# Patient Record
Sex: MALE | Race: WHITE | URBAN - METROPOLITAN AREA
[De-identification: names, ages, dates, MRNs, and addresses within clinical notes are randomized per-mention and may not be internally consistent; named-entity substitution may affect disease eponyms.]

---

## 2018-11-23 ENCOUNTER — OFFICE VISIT (OUTPATIENT)
Dept: FAMILY MEDICINE | Facility: CLINIC | Age: 23
End: 2018-11-23

## 2018-11-23 VITALS
OXYGEN SATURATION: 96 % | DIASTOLIC BLOOD PRESSURE: 97 MMHG | SYSTOLIC BLOOD PRESSURE: 162 MMHG | TEMPERATURE: 98.2 F | WEIGHT: 166 LBS | HEART RATE: 103 BPM | BODY MASS INDEX: 23.5 KG/M2

## 2018-11-23 DIAGNOSIS — Z23 NEED FOR PROPHYLACTIC VACCINATION AND INOCULATION AGAINST INFLUENZA: ICD-10-CM

## 2018-11-23 DIAGNOSIS — L08.9 PUSTULE: ICD-10-CM

## 2018-11-23 DIAGNOSIS — K14.0 TONGUE ULCER: Primary | ICD-10-CM

## 2018-11-23 PROCEDURE — 99214 OFFICE O/P EST MOD 30 MIN: CPT | Mod: 25 | Performed by: FAMILY MEDICINE

## 2018-11-23 PROCEDURE — 90471 IMMUNIZATION ADMIN: CPT | Performed by: FAMILY MEDICINE

## 2018-11-23 PROCEDURE — 90686 IIV4 VACC NO PRSV 0.5 ML IM: CPT | Performed by: FAMILY MEDICINE

## 2018-11-23 RX ORDER — LAMOTRIGINE 25 MG/1
TABLET ORAL
Refills: 2 | COMMUNITY
Start: 2018-11-19

## 2018-11-23 ASSESSMENT — ANXIETY QUESTIONNAIRES
GAD7 TOTAL SCORE: 7
IF YOU CHECKED OFF ANY PROBLEMS ON THIS QUESTIONNAIRE, HOW DIFFICULT HAVE THESE PROBLEMS MADE IT FOR YOU TO DO YOUR WORK, TAKE CARE OF THINGS AT HOME, OR GET ALONG WITH OTHER PEOPLE: VERY DIFFICULT
2. NOT BEING ABLE TO STOP OR CONTROL WORRYING: SEVERAL DAYS
3. WORRYING TOO MUCH ABOUT DIFFERENT THINGS: SEVERAL DAYS
6. BECOMING EASILY ANNOYED OR IRRITABLE: SEVERAL DAYS
1. FEELING NERVOUS, ANXIOUS, OR ON EDGE: SEVERAL DAYS
5. BEING SO RESTLESS THAT IT IS HARD TO SIT STILL: SEVERAL DAYS
7. FEELING AFRAID AS IF SOMETHING AWFUL MIGHT HAPPEN: SEVERAL DAYS

## 2018-11-23 ASSESSMENT — PATIENT HEALTH QUESTIONNAIRE - PHQ9
5. POOR APPETITE OR OVEREATING: SEVERAL DAYS
SUM OF ALL RESPONSES TO PHQ QUESTIONS 1-9: 9

## 2018-11-23 NOTE — PATIENT INSTRUCTIONS
1. Set up the ENT appointment for the tongue ulcer.    2. Let me see you back in about 3 months for a physical with fasting labs.    3. Send My Chart message if you have any questions.

## 2018-11-23 NOTE — LETTER
My Depression Action Plan  Name: Boris Tinoco   Date of Birth 1995  Date: 11/23/2018    My doctor: Reena Heard   My clinic: [facility]  [id number] [place] [id number] [phone]          GREEN    ZONE   Good Control    What it looks like:     Things are going generally well. You have normal up s and down s. You may even feel depressed from time to time, but bad moods usually last less than a day.   What you need to do:  1. Continue to care for yourself (see self care plan)  2. Check your depression survival kit and update it as needed  3. Follow your physician s recommendations including any medication.  4. Do not stop taking medication unless you consult with your physician first.           YELLOW         ZONE Getting Worse    What it looks like:     Depression is starting to interfere with your life.     It may be hard to get out of bed; you may be starting to isolate yourself from others.    Symptoms of depression are starting to last most all day and this has happened for several days.     You may have suicidal thoughts but they are not constant.   What you need to do:     1. Call your care team, your response to treatment will improve if you keep your care team informed of your progress. Yellow periods are signs an adjustment may need to be made.     2. Continue your self-care, even if you have to fake it!    3. Talk to someone in your support network    4. Open up your depression survival kit           RED    ZONE Medical Alert - Get Help    What it looks like:     Depression is seriously interfering with your life.     You may experience these or other symptoms: You can t get out of bed most days, can t work or engage in other necessary activities, you have trouble taking care of basic hygiene, or basic responsibilities, thoughts of suicide or death that will not go away, self-injurious behavior.     What you need to do:  1. Call your care  team and request a same-day appointment. If they are not available (weekends or after hours) call your local crisis line, emergency room or 911.            Depression Self Care Plan / Survival Kit    Self-Care for Depression  Here s the deal. Your body and mind are really not as separate as most people think.  What you do and think affects how you feel and how you feel influences what you do and think. This means if you do things that people who feel good do, it will help you feel better.  Sometimes this is all it takes.  There is also a place for medication and therapy depending on how severe your depression is, so be sure to consult with your medical provider and/ or Behavioral Health Consultant if your symptoms are worsening or not improving.     In order to better manage my stress, I will:    Exercise  Get some form of exercise, every day. This will help reduce pain and release endorphins, the  feel good  chemicals in your brain. This is almost as good as taking antidepressants!  This is not the same as joining a gym and then never going! (they count on that by the way ) It can be as simple as just going for a walk or doing some gardening, anything that will get you moving.      Hygiene   Maintain good hygiene (Get out of bed in the morning, Make your bed, Brush your teeth, Take a shower, and Get dressed like you were going to work, even if you are unemployed).  If your clothes don't fit try to get ones that do.    Diet  I will strive to eat foods that are good for me, drink plenty of water, and avoid excessive sugar, caffeine, alcohol, and other mood-altering substances.  Some foods that are helpful in depression are: complex carbohydrates, B vitamins, flaxseed, fish or fish oil, fresh fruits and vegetables.    Psychotherapy  I agree to participate in Individual Therapy (if recommended).    Medication  If prescribed medications, I agree to take them.  Missing doses can result in serious side effects.  I  understand that drinking alcohol, or other illicit drug use, may cause potential side effects.  I will not stop my medication abruptly without first discussing it with my provider.    Staying Connected With Others  I will stay in touch with my friends, family members, and my primary care provider/team.    Use your imagination  Be creative.  We all have a creative side; it doesn t matter if it s oil painting, sand castles, or mud pies! This will also kick up the endorphins.    Witness Beauty  (AKA stop and smell the roses) Take a look outside, even in mid-winter. Notice colors, textures. Watch the squirrels and birds.     Service to others  Be of service to others.  There is always someone else in need.  By helping others we can  get out of ourselves  and remember the really important things.  This also provides opportunities for practicing all the other parts of the program.    Humor  Laugh and be silly!  Adjust your TV habits for less news and crime-drama and more comedy.    Control your stress  Try breathing deep, massage therapy, biofeedback, and meditation. Find time to relax each day.     My support system    Clinic Contact:  Phone number:    Contact 1:  Phone number:    Contact 2:  Phone number:    [facility]/:  Phone number:    Therapist:  Phone number:    Local crisis center:    Phone number:    Other community support:  Phone number:

## 2018-11-23 NOTE — MR AVS SNAPSHOT
After Visit Summary   11/23/2018    Boris Tinoco    MRN: [id number]           Patient Information     Date Of Birth          1995        Visit Information        Provider Department      11/23/2018 1:30 PM Carrillo Casanvoa MD [facility]        Today's Diagnoses     Tongue ulcer    -  1      Care Instructions    1. Set up the ENT appointment for the tongue ulcer.    2. Let me see you back in about 3 months for a physical with fasting labs.    3. Send My Chart message if you have any questions.          Follow-ups after your visit        Additional Services     OTOLARYNGOLOGY REFERRAL       Your provider has referred you to: [facility]: [facility] [phone]  [email or web address]    Please be aware that coverage of these services is subject to the terms and limitations of your health insurance plan.  Call member services at your health plan with any benefit or coverage questions.      Please bring the following with you to your appointment:    (1) Any X-Rays, CTs or MRIs which have been performed.  Contact the facility where they were done to arrange for  prior to your scheduled appointment.   (2) List of current medications  (3) This referral request   (4) Any documents/labs given to you for this referral                  Follow-up notes from your care team     Return in about 3 months (around 2/23/2019) for Physical Exam, Lab Work.      Who to contact     If you have questions or need follow up information about today's clinic visit or your schedule please contact [facility] directly at [phone].  Normal or non-critical lab and imaging results will be communicated to you by MyChart, letter or phone within 4 business days after the clinic has received the results. If you do not hear from us within 7 days, please contact the clinic through MyChart or phone. If you have a critical or abnormal lab result, we will  "notify you by phone as soon as possible.  Submit refill requests through [employer] or call your pharmacy and they will forward the refill request to us. Please allow 3 business days for your refill to be completed.          Additional Information About Your Visit        [employer] Information     [employer] lets you send messages to your doctor, view your test results, renew your prescriptions, schedule appointments and more. To sign up, go to [email or web address] . Click on \"Log in\" on the left side of the screen, which will take you to the Welcome page. Then click on \"Sign up Now\" on the right side of the page.     You will be asked to enter the access code listed below, as well as some personal information. Please follow the directions to create your username and password.     Your access code is: [email or web address]  Expires: 2019  1:26 PM     Your access code will  in 90 days. If you need help or a new code, please call your [facility] clinic or [phone].        Care EveryWhere ID     This is your Care EveryWhere ID. This could be used by other organizations to access your [facility] medical records  [id number]        Your Vitals Were     Pulse Temperature Pulse Oximetry BMI (Body Mass Index)          103 98.2  F (36.8  C) (Oral) 96% 23.5 kg/m2         Blood Pressure from Last 3 Encounters:   18 (!) 162/97   06/11/15 137/69   14 120/70    Weight from Last 3 Encounters:   18 166 lb (75.3 kg)   06/11/15 147 lb 9.6 oz (67 kg)   14 151 lb (68.5 kg) (45 %)*     * Growth percentiles are based on CDC 2-20 Years data.              We Performed the Following     OTOLARYNGOLOGY REFERRAL        Primary Care Provider Office Phone # Fax #    Reena Heard PA-C [phone] [address] [place] [id number]        Equal Access to Services     CURT STREETER : Zev Comer, toni mosqueda, qaybta kaalmada john john" lashaun giordano. So [facility] [phone].    ATENCIÓN: Si habla ho, tiene a guallpa disposición servicios gratuitos de asistencia lingüística. Llrsus al [phone].    We comply with applicable federal civil rights laws and [place] laws. We do not discriminate on the basis of race, color, national origin, age, disability, sex, sexual orientation, or gender identity.            Thank you!     Thank you for choosing [facility]  for your care. Our goal is always to provide you with excellent care. Hearing back from our patients is one way we can continue to improve our services. Please take a few minutes to complete the written survey that you may receive in the mail after your visit with us. Thank you!             Your Updated Medication List - Protect others around you: Learn how to safely use, store and throw away your medicines at www.disposemymeds.org.          This list is accurate as of 11/23/18  2:08 PM.  Always use your most recent med list.                   Brand Name Dispense Instructions for use Diagnosis    lamoTRIgine 25 MG tablet    LaMICtal

## 2018-11-23 NOTE — NURSING NOTE
"Chief Complaint   Patient presents with     Other     canker sore       Initial BP (!) 162/97 (Cuff Size: Adult Regular)  Pulse 103  Temp 98.2  F (36.8  C) (Oral)  Wt 166 lb (75.3 kg)  SpO2 96%  BMI 23.5 kg/m2 Estimated body mass index is 23.5 kg/(m^2) as calculated from the following:    Height as of 6/11/15: 5' 10.47\" (1.79 m).    Weight as of this encounter: 166 lb (75.3 kg).      Zonia Holliday LPN    "

## 2018-11-23 NOTE — PROGRESS NOTES

## 2018-11-23 NOTE — PROGRESS NOTES
"HPI:    I am seeing Boris Tinoco for the 1st time. He is a 23 year old male here to discuss:    He previously followed with [facility]    Tongue ulcer - he tells me he has history of canker sores. This episode started in Najmay 2018. It is one lesion, located on the lateral, proximal aspect of the tongue. It is not painful but there is an irritation. It gets sensitive with spicy foods. He denies other lesions. No fevers, weight loss. He is former smoker. Currently using e cigs.  Evaluation and treatment:    I told him it would be unusual for a canker sore to last this long without abating.   I am referring him to ENT for further evaluation.    Pustule - in 2016 he was seen at [facility] for genital warts. Those have resolved but he says he has noticed intermittent \"white head\" on the dorsum of the penile shaft. It is painless and there is no ulcer or blister. He says it is current not present. Last sex was 2014.  Evaluation and treatment:    The exam is normal today.   I reassured him that this is likely not an STD.   I would be helpful if he comes in when he gets this lesion or send in a picture (closeup to show the lesion only).    History of seizures - 1st seizure in 2013.  Evaluation and treatment:    He is on Lamictal.   Follows with neurology?    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  Evaluation and treatment:    He was seen here at [place] in 2015 - he was rx'd Zoloft - not taking at this time.   He was seen at [facility] in 2019 - he was rx'd Celexa - not taking at this time.   We did not address this further today.    Elevated BP and heart rate -  Evaluation and treatment:    Likely related to anxiety.   We will recheck at future visits.    As an aside, we gave him a flu shot.    ROS:    Const: No fevers, weight changes or night sweats recently.  ENT: No runny nose, sore throat or ear pain.  Resp: No cough or shortness of breath.  CV: No chest pain, dizziness " or cardiac palpitations.  GI: No nausea, vomiting, diarrhea or constipation. Denies blood in stools or black stools.  : No dysuria, frequency or hematuria.  The rest of the ROS negative, other than listed on HPI    Social History     Social History     Marital status: Single     Spouse name: N/A     Number of children: N/A     Years of education: N/A     Occupational History     Not on file.     Social History Main Topics     Smoking status: Never Smoker     Smokeless tobacco: Never Used      Comment: e-CIG     Alcohol use Yes      Comment: DAILY     Drug use: No     Sexual activity: Yes     Other Topics Concern     Not on file     Social History Narrative         Exam:    BP (!) 162/97 (Cuff Size: Adult Regular)  Pulse 103  Temp 98.2  F (36.8  C) (Oral)  Wt 166 lb (75.3 kg)  SpO2 96%  BMI 23.5 kg/m2    Gen: Healthy appearing male in no acute distress. He is here with mom, except for the genital exam we went to a different room.  ENT: Oropharynx normal. Oral mucosa dry due to being nervous today. The lesion is difficult to find without close exam. It is located on the lateral, proximal aspect of the tongue. It is a shallow ulcer with whitish crater. It is oval about 4 x 5 mm. It has no surrounding erythema or swelling. No discharge.  Eyes: Conjunctiva and sclera normal. Pupils react normally to light. No nystagmus.  Neck: No enlarged lymph nodes, thyromegally or other masses.  Lungs: Good air movement and otherwise clear.  CV: Heart RRR with no murmurs.  Psych: affect is visibly anxious, nervous.   Skin: the penis appears normal without any lesions.    Assessment and Plan - Decision Making    1. Tongue ulcer    Per HPI    - OTOLARYNGOLOGY REFERRAL    2. Pustule    Per HPI      3. Need for prophylactic vaccination and inoculation against influenza    - FLU VACCINE, SPLIT VIRUS, IM (QUADRIVALENT) [18551]- >3 YRS  - Vaccine Administration, Initial [68848]      Written instructions given as follows:    Patient  Instructions   1. Set up the ENT appointment for the tongue ulcer.    2. Let me see you back in about 3 months for a physical with fasting labs.    3. Send My Chart message if you have any questions.

## 2018-11-24 ASSESSMENT — ANXIETY QUESTIONNAIRES: GAD7 TOTAL SCORE: 7

## 2018-11-29 ENCOUNTER — OFFICE VISIT (OUTPATIENT)
Dept: OTOLARYNGOLOGY | Facility: CLINIC | Age: 23
End: 2018-11-29

## 2018-11-29 VITALS
HEART RATE: 97 BPM | HEIGHT: 71 IN | DIASTOLIC BLOOD PRESSURE: 95 MMHG | SYSTOLIC BLOOD PRESSURE: 164 MMHG | BODY MASS INDEX: 23.66 KG/M2 | RESPIRATION RATE: 18 BRPM | WEIGHT: 169 LBS | OXYGEN SATURATION: 97 % | TEMPERATURE: 97.6 F

## 2018-11-29 DIAGNOSIS — K14.0 TONGUE ULCERATION: Primary | ICD-10-CM

## 2018-11-29 PROCEDURE — 99203 OFFICE O/P NEW LOW 30 MIN: CPT | Performed by: OTOLARYNGOLOGY

## 2018-11-29 ASSESSMENT — PAIN SCALES - GENERAL: PAINLEVEL: NO PAIN (0)

## 2018-11-29 NOTE — LETTER
11/29/2018         RE: Boris Tinoco  [address] [place] [address]        Dear Colleague,    Thank you for referring your patient, Boris Tinoco, to the [facility]. Please see a copy of my visit note below.        History of Present Illness - Boris Tinoco is a 23 year old male with a tongue lesion for 9 months since 03/2018. Patient states it is sensitive to spicy food, and will occasionally turn white. He denies that it ever goes away completely. He denies any inciting events. Patient appears anxious and has many questions. He complains of a sore throat for the past week. He also has an intermittent lump in his throat. He has noticed symmetric bumps on the inside of his cheeks.    Past Medical History -   Patient Active Problem List   Diagnosis     Seizure disorder (H)     Generalized anxiety disorder     Depression with anxiety       Current Medications -   Current Outpatient Prescriptions:      lamoTRIgine (LAMICTAL) 25 MG tablet, , Disp: , Rfl: 2    Allergies - No Known Allergies    Social History -   Social History     Social History     Marital status: Single     Spouse name: N/A     Number of children: N/A     Years of education: N/A     Social History Main Topics     Smoking status: Never Smoker     Smokeless tobacco: Never Used      Comment: e-CIG     Alcohol use Yes      Comment: DAILY     Drug use: No     Sexual activity: Yes     Other Topics Concern     Not on file     Social History Narrative       Family History -   Family History   Problem Relation Age of Onset     Depression Mother      Diabetes No family hx of      Coronary Artery Disease No family hx of      Hypertension No family hx of      Hyperlipidemia No family hx of      Cerebrovascular Disease No family hx of        Review of Systems - As per HPI and PMHx, otherwise 7 system review of the head and neck negative. 10+ system review negative.    Physical Exam  BP (!) 164/95 (BP Location: Right  "arm, Patient Position: Sitting, Cuff Size: Adult Regular)  Pulse 97  Temp 97.6  F (36.4  C)  Resp 18  Ht 1.796 m (5' 10.7\")  Wt 76.7 kg (169 lb)  SpO2 97%  BMI 23.77 kg/m2  General - The patient is well nourished and well developed, and appears to have good nutritional status.  Alert and oriented to person and place, answers questions and cooperates with examination appropriately.   Head and Face - Normocephalic and atraumatic, with no gross asymmetry noted of the contour of the facial features.  The facial nerve is intact, with strong symmetric movements.  Voice and Breathing - The patient was breathing comfortably without the use of accessory muscles. There was no wheezing, stridor, or stertor.  The patients voice was clear and strong, and had appropriate pitch and quality.  Ears - Bilateral pinna and EACs with normal appearing overlying skin. Tympanic membrane intact with good mobility on pneumatic otoscopy bilaterally. Bony landmarks of the ossicular chain are normal. The tympanic membranes are normal in appearance. No retraction, perforation, or masses.  No fluid or purulence was seen in the external canal or the middle ear.   Eyes - Extraocular movements intact.  Sclera were not icteric or injected, conjunctiva were pink and moist.  Mouth - Examination of the oral cavity showed pink, healthy oral mucosa. The tongue was mobile and midline, and the dentition were in good condition. 4x3mm fibrinous ulcer on the right ventrolateral tongue, bleeds when scraped with a cotton swab.  I applied topical lidocaine to ulceration. Then proceeded to apply silver nitrate to cauterize the area.   Throat - The walls of the oropharynx were smooth, pink, moist, symmetric, and had no lesions or ulcerations.  The tonsils were inflamed bilaterally and soft palate was symmetric.  The uvula was midline on elevation.   Neck - Normal midline excursion of the laryngotracheal complex during swallowing.  Full range of motion on " passive movement.  Palpation of the occipital, submental, submandibular, internal jugular chain, and supraclavicular nodes did not demonstrate any abnormal lymph nodes or masses.  The carotid pulse was palpable bilaterally.  Palpation of the thyroid was soft and smooth, with no nodules or goiter appreciated.  The trachea was mobile and midline.  Nose - External contour is symmetric, no gross deflection or scars.  Nasal mucosa is pink and moist with no abnormal mucus.  The septum was midline and non-obstructive, turbinates of normal size and position.  No polyps, masses, or purulence noted on examination.      Assessment - Boris Tinoco is a 23 year old male with an oral ulceration on the right tongue. I reassured him that this does not appear malignant. I tried to ablate the lesion with silver nitrate. I asked him to return in the next few weeks for f/u. If the lesion recurs, may consider excision for pathology.    Patient to follow up with Primary Care provider regarding elevated blood pressure.    Dr. Suzy Figueroa MD  Otolaryngology  [facility]    This document serves as a record of the services and decisions personally performed and made by Dr. Suzy Figueroa MD. It was created on his behalf by Karolina Prescott, a trained medical scribe. The creation of this document is based the provider's statements to the medical scribe.    Sivakumar Prescott 1:14 PM 11/29/2018     The information in this document, created by a scribe for me, accurately reflects the services I personally performed and the decisions made by me. I have reviewed and approved this document for accuracy.    Again, thank you for allowing me to participate in the care of your patient.        Sincerely,        Suzy Figueroa MD

## 2018-11-29 NOTE — MR AVS SNAPSHOT
After Visit Summary   11/29/2018    Boris Tinoco    MRN: [id number]           Patient Information     Date Of Birth          1995        Visit Information        Provider Department      11/29/2018 1:00 PM Suzy Figueroa MD [facility]        Today's Diagnoses     Tongue ulceration    -  1      Care Instructions    Scheduling Information  To schedule your CT/MRI scan, please contact [facility] at [phone] OR [facility] at [phone]    To schedule your Surgery, please contact our Specialty Schedulers at [phone]    ** If a CT scan or biopsy were ordered/done, Dr. Figueroa will need to see you back in clinic to go over your biopsy results or CT/MRI scan. He will go over the images from your scan with you and discuss treatment based on your results.     ENT Clinic Locations Clinic Hours Telephone Number     [facility]  [address]  [place] [address]   E/O Thursday:      7:30am -- 4:00pm   To schedule/reschedule an appointment with   Dr. Figueroa,   please contact our   Specialty Scheduling Department at:     [phone]       [facility]  [address] [place] [address]     Monday:              12:00pm -- 4:00pm    Tuesday:               8:30am -- 4:30pm    Wednesday:        12:00pm -- 4:00pm    E/O Thursday:        8:00am - 4:30pm           Urgent Care Locations Clinic Hours Telephone Numbers     [facility]  [address]  [place] [address]     Monday-Friday:     11:00am - 9:00pm    Saturday-Sunday:  9:00am - 5:00pm   [phone]     [facility]  [address] [facility] [place] [address]     Monday-Friday:      5:00pm - 9:00pm     Saturday-Sunday:  9:00am - 5:00pm   [phone]   \          Follow-ups after your visit        Who to contact     If you have questions or need follow up information about today's clinic visit or your schedule please contact [facility] directly at  "[phone].  Normal or non-critical lab and imaging results will be communicated to you by MyChart, letter or phone within 4 business days after the clinic has received the results. If you do not hear from us within 7 days, please contact the clinic through [employer] or phone. If you have a critical or abnormal lab result, we will notify you by phone as soon as possible.  Submit refill requests through [employer] or call your pharmacy and they will forward the refill request to us. Please allow 3 business days for your refill to be completed.          Additional Information About Your Visit        [employer] Information     [employer] gives you secure access to your electronic health record. If you see a primary care provider, you can also send messages to your care team and make appointments. If you have questions, please call your primary care clinic.  If you do not have a primary care provider, please call [phone] and they will assist you.        Care EveryWhere ID     This is your Care EveryWhere ID. This could be used by other organizations to access your [facility] medical records  [id number]        Your Vitals Were     Pulse Temperature Respirations Height Pulse Oximetry BMI (Body Mass Index)    97 97.6  F (36.4  C) 18 1.796 m (5' 10.7\") 97% 23.77 kg/m2       Blood Pressure from Last 3 Encounters:   11/29/18 (!) 164/95   11/23/18 (!) 162/97   06/11/15 137/69    Weight from Last 3 Encounters:   11/29/18 76.7 kg (169 lb)   11/23/18 75.3 kg (166 lb)   06/11/15 67 kg (147 lb 9.6 oz)              Today, you had the following     No orders found for display       Primary Care Provider Office Phone # Fax #    Reena Heard PA-C [phone]       [address] [place] [address]        Equal Access to Services     CUR[employer] STREETER : Zev conner Somarleen, waaxda luqadaha, qaybta kaalmasabine john, john giordano. So [facility] [phone].    ATENCIÓN: Si habla " español, tiene a guallpa disposición servicios gratuitos de asistencia lingüística. Harish beltran [phone].    We comply with applicable federal civil rights laws and [place] laws. We do not discriminate on the basis of race, color, national origin, age, disability, sex, sexual orientation, or gender identity.            Thank you!     Thank you for choosing [facility]  for your care. Our goal is always to provide you with excellent care. Hearing back from our patients is one way we can continue to improve our services. Please take a few minutes to complete the written survey that you may receive in the mail after your visit with us. Thank you!             Your Updated Medication List - Protect others around you: Learn how to safely use, store and throw away your medicines at www.disposemymeds.org.          This list is accurate as of 11/29/18  1:46 PM.  Always use your most recent med list.                   Brand Name Dispense Instructions for use Diagnosis    lamoTRIgine 25 MG tablet    LaMICtal

## 2018-11-29 NOTE — PATIENT INSTRUCTIONS
Scheduling Information  To schedule your CT/MRI scan, please contact [facility] at [phone] OR [facility] at [phone]    To schedule your Surgery, please contact our Specialty Schedulers at [phone]    ** If a CT scan or biopsy were ordered/done, Dr. Figueroa will need to see you back in clinic to go over your biopsy results or CT/MRI scan. He will go over the images from your scan with you and discuss treatment based on your results.     ENT Clinic Locations Clinic Hours Telephone Number     [facility]  [address]  [place] [address]   E/O Thursday:      7:30am -- 4:00pm   To schedule/reschedule an appointment with   Dr. Figueroa,   please contact our   Specialty Scheduling Department at:     [phone]       [facility]  [address] [place] [address]     Monday:              12:00pm -- 4:00pm    Tuesday:               8:30am -- 4:30pm    Wednesday:        12:00pm -- 4:00pm    E/O Thursday:        8:00am - 4:30pm           Urgent Care Locations Clinic Hours Telephone Numbers     [facility]  [address] [place] [address]     Monday-Friday:     11:00am - 9:00pm    Saturday-Sunday:  9:00am - 5:00pm   [phone]     [facility]  [address] [place] [address]     Monday-Friday:      5:00pm - 9:00pm     Saturday-Sunday:  9:00am - 5:00pm   [phone]   \

## 2018-11-29 NOTE — PROGRESS NOTES
"    History of Present Illness - Boris Tinoco is a 23 year old male with a tongue lesion for 9 months since 03/2018. Patient states it is sensitive to spicy food, and will occasionally turn white. He denies that it ever goes away completely. He denies any inciting events. Patient appears anxious and has many questions. He complains of a sore throat for the past week. He also has an intermittent lump in his throat. He has noticed symmetric bumps on the inside of his cheeks.    Past Medical History -   Patient Active Problem List   Diagnosis     Seizure disorder (H)     Generalized anxiety disorder     Depression with anxiety       Current Medications -   Current Outpatient Prescriptions:      lamoTRIgine (LAMICTAL) 25 MG tablet, , Disp: , Rfl: 2    Allergies - No Known Allergies    Social History -   Social History     Social History     Marital status: Single     Spouse name: N/A     Number of children: N/A     Years of education: N/A     Social History Main Topics     Smoking status: Never Smoker     Smokeless tobacco: Never Used      Comment: e-CIG     Alcohol use Yes      Comment: DAILY     Drug use: No     Sexual activity: Yes     Other Topics Concern     Not on file     Social History Narrative       Family History -   Family History   Problem Relation Age of Onset     Depression Mother      Diabetes No family hx of      Coronary Artery Disease No family hx of      Hypertension No family hx of      Hyperlipidemia No family hx of      Cerebrovascular Disease No family hx of        Review of Systems - As per HPI and PMHx, otherwise 7 system review of the head and neck negative. 10+ system review negative.    Physical Exam  BP (!) 164/95 (BP Location: Right arm, Patient Position: Sitting, Cuff Size: Adult Regular)  Pulse 97  Temp 97.6  F (36.4  C)  Resp 18  Ht 1.796 m (5' 10.7\")  Wt 76.7 kg (169 lb)  SpO2 97%  BMI 23.77 kg/m2  General - The patient is well nourished and well developed, and appears to " have good nutritional status.  Alert and oriented to person and place, answers questions and cooperates with examination appropriately.   Head and Face - Normocephalic and atraumatic, with no gross asymmetry noted of the contour of the facial features.  The facial nerve is intact, with strong symmetric movements.  Voice and Breathing - The patient was breathing comfortably without the use of accessory muscles. There was no wheezing, stridor, or stertor.  The patients voice was clear and strong, and had appropriate pitch and quality.  Ears - Bilateral pinna and EACs with normal appearing overlying skin. Tympanic membrane intact with good mobility on pneumatic otoscopy bilaterally. Bony landmarks of the ossicular chain are normal. The tympanic membranes are normal in appearance. No retraction, perforation, or masses.  No fluid or purulence was seen in the external canal or the middle ear.   Eyes - Extraocular movements intact.  Sclera were not icteric or injected, conjunctiva were pink and moist.  Mouth - Examination of the oral cavity showed pink, healthy oral mucosa. The tongue was mobile and midline, and the dentition were in good condition. 4x3mm fibrinous ulcer on the right ventrolateral tongue, bleeds when scraped with a cotton swab.  I applied topical lidocaine to ulceration. Then proceeded to apply silver nitrate to cauterize the area.   Throat - The walls of the oropharynx were smooth, pink, moist, symmetric, and had no lesions or ulcerations.  The tonsils were inflamed bilaterally and soft palate was symmetric.  The uvula was midline on elevation.   Neck - Normal midline excursion of the laryngotracheal complex during swallowing.  Full range of motion on passive movement.  Palpation of the occipital, submental, submandibular, internal jugular chain, and supraclavicular nodes did not demonstrate any abnormal lymph nodes or masses.  The carotid pulse was palpable bilaterally.  Palpation of the thyroid was soft  and smooth, with no nodules or goiter appreciated.  The trachea was mobile and midline.  Nose - External contour is symmetric, no gross deflection or scars.  Nasal mucosa is pink and moist with no abnormal mucus.  The septum was midline and non-obstructive, turbinates of normal size and position.  No polyps, masses, or purulence noted on examination.      Assessment - Boris Tinoco is a 23 year old male with an oral ulceration on the right tongue. I reassured him that this does not appear malignant. I tried to ablate the lesion with silver nitrate. I asked him to return in the next few weeks for f/u. If the lesion recurs, may consider excision for pathology.    Patient to follow up with Primary Care provider regarding elevated blood pressure.    Dr. Suzy Figueroa MD  Otolaryngology  [facility]    This document serves as a record of the services and decisions personally performed and made by Dr. Suzy Figueroa MD. It was created on his behalf by Karolina Prescott, a trained medical scribe. The creation of this document is based the provider's statements to the medical scribe.    Sivakumar Prescott 1:14 PM 11/29/2018     The information in this document, created by a scribe for me, accurately reflects the services I personally performed and the decisions made by me. I have reviewed and approved this document for accuracy.

## 2020-03-01 ENCOUNTER — HEALTH MAINTENANCE LETTER (OUTPATIENT)
Age: 25
End: 2020-03-01

## 2020-12-14 ENCOUNTER — HEALTH MAINTENANCE LETTER (OUTPATIENT)
Age: 25
End: 2020-12-14

## 2021-04-18 ENCOUNTER — HEALTH MAINTENANCE LETTER (OUTPATIENT)
Age: 26
End: 2021-04-18

## 2021-10-02 ENCOUNTER — HEALTH MAINTENANCE LETTER (OUTPATIENT)
Age: 26
End: 2021-10-02

## 2022-05-14 ENCOUNTER — HEALTH MAINTENANCE LETTER (OUTPATIENT)
Age: 27
End: 2022-05-14

## 2022-09-03 ENCOUNTER — HEALTH MAINTENANCE LETTER (OUTPATIENT)
Age: 27
End: 2022-09-03

## 2023-06-03 ENCOUNTER — HEALTH MAINTENANCE LETTER (OUTPATIENT)
Age: 28
End: 2023-06-03